# Patient Record
Sex: FEMALE | Race: WHITE
[De-identification: names, ages, dates, MRNs, and addresses within clinical notes are randomized per-mention and may not be internally consistent; named-entity substitution may affect disease eponyms.]

---

## 2018-08-01 ENCOUNTER — HOSPITAL ENCOUNTER (EMERGENCY)
Dept: HOSPITAL 62 - ER | Age: 23
Discharge: HOME | End: 2018-08-01
Payer: OTHER GOVERNMENT

## 2018-08-01 VITALS — SYSTOLIC BLOOD PRESSURE: 120 MMHG | DIASTOLIC BLOOD PRESSURE: 68 MMHG

## 2018-08-01 DIAGNOSIS — Z88.0: ICD-10-CM

## 2018-08-01 DIAGNOSIS — N39.0: Primary | ICD-10-CM

## 2018-08-01 DIAGNOSIS — R11.2: ICD-10-CM

## 2018-08-01 LAB
APPEARANCE UR: (no result)
APTT PPP: YELLOW S
BILIRUB UR QL STRIP: NEGATIVE
GLUCOSE UR STRIP-MCNC: NEGATIVE MG/DL
KETONES UR STRIP-MCNC: NEGATIVE MG/DL
NITRITE UR QL STRIP: NEGATIVE
PH UR STRIP: 9 [PH] (ref 5–9)
PROT UR STRIP-MCNC: 30 MG/DL
SP GR UR STRIP: 1.01
UROBILINOGEN UR-MCNC: NEGATIVE MG/DL (ref ?–2)

## 2018-08-01 PROCEDURE — 99284 EMERGENCY DEPT VISIT MOD MDM: CPT

## 2018-08-01 PROCEDURE — 81025 URINE PREGNANCY TEST: CPT

## 2018-08-01 PROCEDURE — S0119 ONDANSETRON 4 MG: HCPCS

## 2018-08-01 PROCEDURE — 81001 URINALYSIS AUTO W/SCOPE: CPT

## 2018-08-01 RX ADMIN — ONDANSETRON ONE MG: 4 TABLET, ORALLY DISINTEGRATING ORAL at 11:24

## 2018-08-01 NOTE — ER DOCUMENT REPORT
ED Medical Screen (RME)





- General


Chief Complaint: Abdominal Pain


Stated Complaint: ABDOMINAL CRAMPING


Time Seen by Provider: 08/01/18 11:11


Mode of Arrival: Ambulatory


Information source: Patient


TRAVEL OUTSIDE OF THE [place] IN LAST 30 DAYS: No





- HPI


Patient complains to provider of: Nausea and vomiting


Onset: Other - 3 days prior


Onset/Duration: Persistent


Quality of pain: No pain


Associated Symptoms: Nausea


Exacerbated by: Food


Similar symptoms previously: No


Recently seen / treated by doctor: No





- Related Data


Allergies/Adverse Reactions: 


 





Penicillins Allergy (Verified 08/01/18 10:13)


 











Past Medical History





- Social History


Cigarette use (# per day): No


Chew tobacco use (# tins/day): No


Frequency of alcohol use: Occasional


Drug Abuse: None





- Past Medical History


Cardiac Medical History: Reports: None


Pulmonary Medical History: Reports: None


EENT Medical History: Reports: None


Neurological Medical History: Reports: None


Endocrine Medical History: Reports: None





Review of Systems





- Review of Systems


Notes: 





All systems reviewed and otherwise negative





Physical Exam





- Vital signs


Vitals: 


 











Temp Pulse Resp BP Pulse Ox


 


 98.1 F   66   14   116/71   97 


 


 08/01/18 10:16  08/01/18 10:16  08/01/18 10:16  08/01/18 10:16  08/01/18 10:16














- General


General appearance: Appears well


In distress: None





- HEENT


Head: Normocephalic


Eyes: Normal


Conjunctiva: Normal





- Respiratory


Respiratory status: No respiratory distress


Chest status: Nontender


Breath sounds: Normal





- Abdominal


Inspection: Normal


Distension: No distension


Bowel sounds: Normal


Tenderness: Nontender





- Genitourinary


External exam: Normal





- Back


Back: Normal





- Extremities


General upper extremity: Normal inspection


General lower extremity: Normal inspection


Shoulder: Normal


Arm: Normal





- Neurological


Neuro grossly intact: Yes


Cognition: Normal


Orientation: AAOx4





Course





- Re-evaluation


Re-evalutation: 





08/01/18 14:02


22-year-old female presents for evaluation of nausea and vomiting for the last 

2 days.  She notes that she started her transdermal patch for birth control and 

increased nausea.  She denies any abdominal pain cramping pelvic pain discharge.


We will plan to treat with Zofran p.o. challenge and obtain a urine as well.


Patient is tolerated orals after obtaining Zofran, does have evidence of some 

pyuria as such we will presumptively treat for potential cystitis as an 

underlying cause of her nausea and vomiting.


We will treat with Zofran for symptom control and Keflex for her infection, 

will plan for return if in case of worsening symptoms





- Vital Signs


Vital signs: 


 











Temp Pulse Resp BP Pulse Ox


 


 98.1 F   66   18   120/68   99 


 


 08/01/18 10:16  08/01/18 10:16  08/01/18 12:48  08/01/18 12:48  08/01/18 12:48














- Laboratory


Laboratory results interpreted by me: 


 











  08/01/18





  11:17


 


Urine Protein  30 H


 


Ur Leukocyte Esterase  MODERATE H














Doctor's Discharge





- Discharge


Condition: Stable


Disposition: HOME, SELF-CARE


Instructions:  Abdominal Pain ([facility])


Additional Instructions: 


you were seen today in the emergency department for nausea and vomiting as well 

as the abdominal pain you had.  you had an evaluation including a physical exam 

as well as a urine test and medications.


You should use the nausea medication only as needed, use the antibiotic as 

directed, in case of inability to eat or drink he should return to the 

emergency room as this could be a more serious condition.


Prescriptions: 


Cephalexin Monohydrate [Keflex 500 mg Capsule] 500 mg PO BID 5 Days  capsule


Ondansetron [Zofran Odt 4 mg Tablet] 1 - 2 tab PO Q4H PRN #15 tab.rapdis


 PRN Reason: For Nausea/Vomiting

## 2018-11-03 ENCOUNTER — HOSPITAL ENCOUNTER (EMERGENCY)
Dept: HOSPITAL 62 - ER | Age: 23
Discharge: HOME | End: 2018-11-03
Payer: COMMERCIAL

## 2018-11-03 VITALS — SYSTOLIC BLOOD PRESSURE: 116 MMHG | DIASTOLIC BLOOD PRESSURE: 69 MMHG

## 2018-11-03 DIAGNOSIS — N93.9: Primary | ICD-10-CM

## 2018-11-03 DIAGNOSIS — F12.10: ICD-10-CM

## 2018-11-03 DIAGNOSIS — R11.0: ICD-10-CM

## 2018-11-03 DIAGNOSIS — F17.200: ICD-10-CM

## 2018-11-03 LAB
ADD MANUAL DIFF: NO
ALBUMIN SERPL-MCNC: 4.5 G/DL (ref 3.5–5)
ALP SERPL-CCNC: 57 U/L (ref 38–126)
ALT SERPL-CCNC: 22 U/L (ref 9–52)
ANION GAP SERPL CALC-SCNC: 10 MMOL/L (ref 5–19)
APPEARANCE UR: CLEAR
APTT PPP: YELLOW S
AST SERPL-CCNC: 23 U/L (ref 14–36)
BASOPHILS # BLD AUTO: 0 10^3/UL (ref 0–0.2)
BASOPHILS NFR BLD AUTO: 0.5 % (ref 0–2)
BILIRUB DIRECT SERPL-MCNC: 0.2 MG/DL (ref 0–0.4)
BILIRUB SERPL-MCNC: 0.9 MG/DL (ref 0.2–1.3)
BILIRUB UR QL STRIP: NEGATIVE
BUN SERPL-MCNC: 7 MG/DL (ref 7–20)
CALCIUM: 9.7 MG/DL (ref 8.4–10.2)
CHLAM PCR: NOT DETECTED
CHLORIDE SERPL-SCNC: 102 MMOL/L (ref 98–107)
CO2 SERPL-SCNC: 29 MMOL/L (ref 22–30)
EOSINOPHIL # BLD AUTO: 0.1 10^3/UL (ref 0–0.6)
EOSINOPHIL NFR BLD AUTO: 1.6 % (ref 0–6)
ERYTHROCYTE [DISTWIDTH] IN BLOOD BY AUTOMATED COUNT: 13.8 % (ref 11.5–14)
GLUCOSE SERPL-MCNC: 83 MG/DL (ref 75–110)
GLUCOSE UR STRIP-MCNC: NEGATIVE MG/DL
GON PCR: NOT DETECTED
HCT VFR BLD CALC: 43.9 % (ref 36–47)
HGB BLD-MCNC: 15.2 G/DL (ref 12–15.5)
KETONES UR STRIP-MCNC: NEGATIVE MG/DL
LYMPHOCYTES # BLD AUTO: 2.2 10^3/UL (ref 0.5–4.7)
LYMPHOCYTES NFR BLD AUTO: 40.3 % (ref 13–45)
MCH RBC QN AUTO: 31.8 PG (ref 27–33.4)
MCHC RBC AUTO-ENTMCNC: 34.6 G/DL (ref 32–36)
MCV RBC AUTO: 92 FL (ref 80–97)
MONOCYTES # BLD AUTO: 0.4 10^3/UL (ref 0.1–1.4)
MONOCYTES NFR BLD AUTO: 7.6 % (ref 3–13)
NEUTROPHILS # BLD AUTO: 2.7 10^3/UL (ref 1.7–8.2)
NEUTS SEG NFR BLD AUTO: 50 % (ref 42–78)
NITRITE UR QL STRIP: NEGATIVE
PH UR STRIP: 6 [PH] (ref 5–9)
PLATELET # BLD: 243 10^3/UL (ref 150–450)
POTASSIUM SERPL-SCNC: 4.4 MMOL/L (ref 3.6–5)
PROT SERPL-MCNC: 7.2 G/DL (ref 6.3–8.2)
PROT UR STRIP-MCNC: NEGATIVE MG/DL
RBC # BLD AUTO: 4.77 10^6/UL (ref 3.72–5.28)
RBCS (WET MOUNT): (no result)
SODIUM SERPL-SCNC: 141.1 MMOL/L (ref 137–145)
SP GR UR STRIP: 1.02
T.VAGINALIS (WET MOUNT): (no result)
TOTAL CELLS COUNTED % (AUTO): 100 %
UROBILINOGEN UR-MCNC: NEGATIVE MG/DL (ref ?–2)
WBC # BLD AUTO: 5.4 10^3/UL (ref 4–10.5)
WBCS (WET MOUNT): (no result)
YEAST (WET MOUNT): (no result)

## 2018-11-03 PROCEDURE — 99284 EMERGENCY DEPT VISIT MOD MDM: CPT

## 2018-11-03 PROCEDURE — 87591 N.GONORRHOEAE DNA AMP PROB: CPT

## 2018-11-03 PROCEDURE — 87210 SMEAR WET MOUNT SALINE/INK: CPT

## 2018-11-03 PROCEDURE — 81025 URINE PREGNANCY TEST: CPT

## 2018-11-03 PROCEDURE — 85025 COMPLETE CBC W/AUTO DIFF WBC: CPT

## 2018-11-03 PROCEDURE — 87491 CHLMYD TRACH DNA AMP PROBE: CPT

## 2018-11-03 PROCEDURE — 81001 URINALYSIS AUTO W/SCOPE: CPT

## 2018-11-03 PROCEDURE — 80053 COMPREHEN METABOLIC PANEL: CPT

## 2018-11-03 PROCEDURE — 36415 COLL VENOUS BLD VENIPUNCTURE: CPT

## 2018-11-03 NOTE — ER DOCUMENT REPORT
ED Medical Screen (RME)





- General


Chief Complaint: Vaginal Bleeding


Stated Complaint: VAGINAL BLEEDING


Time Seen by Provider: 11/03/18 15:27


Mode of Arrival: Ambulatory


Information source: Patient


TRAVEL OUTSIDE OF THE [place] IN LAST 30 DAYS: No





- HPI


Patient complains to provider of: vaginal bleeding


Onset: Yesterday - pt. started with heavy bleeding yesterday even though her 

most receny period ended





- Related Data


Allergies/Adverse Reactions: 


 





Penicillins Allergy (Verified 08/01/18 10:13)


 











Past Medical History


Renal/ Medical History: Denies: Hx Peritoneal Dialysis


Psychiatric Medical History: Reports: Hx Depression





Physical Exam





- Vital signs


Vitals: 





 











Temp Pulse Resp BP Pulse Ox


 


 98.3 F   52 L  16   126/77 H  97 


 


 11/03/18 14:54  11/03/18 14:54  11/03/18 14:54  11/03/18 14:54  11/03/18 14:54














Course





- Vital Signs


Vital signs: 





 











Temp Pulse Resp BP Pulse Ox


 


 98.3 F   52 L  16   126/77 H  97 


 


 11/03/18 14:54  11/03/18 14:54  11/03/18 14:54  11/03/18 14:54  11/03/18 14:54

## 2018-11-03 NOTE — ER DOCUMENT REPORT
ED GI/





- General


Chief Complaint: Vaginal Bleeding


Stated Complaint: VAGINAL BLEEDING


Time Seen by Provider: 11/03/18 15:27


Mode of Arrival: Ambulatory


Notes: 





Patient says she has been having very heavy vaginal since Monday.  She is 

passing some small clots as well as the bleeding.  Never had this before.  She 

is on birth control pills.  She had a normal menstrual cycle about a week ago 

and then started her birth control pills about 4-5 days before she started 

having the heavy bleeding on Monday.  When the bleeding started, she stopped 

taking her birth control pills.  She has been having some pelvic cramping.  

Nauseated without vomiting.  No fevers.


Patient has never been pregnant.





TRAVEL OUTSIDE OF THE [place] IN LAST 30 DAYS: No





- Related Data


Allergies/Adverse Reactions: 


 





Penicillins Allergy (Verified 08/01/18 10:13)


 











Past Medical History





- General


Information source: Patient





- Social History


Smoking Status: Current Every Day Smoker


Frequency of alcohol use: Social


Drug Abuse: Marijuana


Family History: Reviewed & Not Pertinent


Patient has suicidal ideation: No


Patient has homicidal ideation: No


Psychiatric Medical History: Reports: Hx Depression





Review of Systems





- Review of Systems


Notes: 





REVIEW OF SYSTEMS:





CONSTITUTIONAL :  Denies fever.


  


EENT:   Denies eye, ear, nose or mouth or throat pain or other symptoms.





CARDIOVASCULAR:  Denies chest pain.





RESPIRATORY:  Denies cough, chest congestion, or shortness of breath.





GASTROINTESTINAL:  Denies abdominal pain or vomiting, or diarrhea.  Nauseated





GENITOURINARY:  Denies difficulty or painful urinating, urinary frequency, 

blood in urine.





MUSCULOSKELETAL:  Denies back or neck pain.  Denies joint pain or swelling.





SKIN:   Denies rash or skin lesions.





NEUROLOGICAL:  Denies LOC or altered mental status.  Denies headache.  Denies 

sensory loss or motor deficits.





ALL OTHER SYSTEMS REVIEWED AND NEGATIVE.





Physical Exam





- Vital signs


Vitals: 


 











Temp Pulse Resp BP Pulse Ox


 


 98.3 F   52 L  16   126/77 H  97 


 


 11/03/18 14:54  11/03/18 14:54  11/03/18 14:54  11/03/18 14:54  11/03/18 14:54











Interpretation: Normal


Notes: 





PHYSICAL EXAMINATION:





GENERAL: Well-appearing, in no acute distress.





HEAD: Atraumatic, normocephalic.





EYES: Pupils equal round and reactive to light, extraocular movements intact.





ENT: oropharynx clear without exudates.  Moist mucous membranes.





NECK: Normal range of motion, supple.





LUNGS: Breath sounds clear and equal bilaterally.





HEART: Regular rate and rhythm without murmurs.





ABDOMEN: Soft, only minimally tender diffusely.  No guarding or rebound.  No 

masses.





BACK:  No tenderness throughout entire back.





EXTREMITIES: Normal range of motion without pain.





NEUROLOGICAL:  Normal speech, normal gait.  Normal sensory, motor, and reflex 

exams.  Awake, alert, and oriented x3.  Cranial nerves normal.





PSYCH: Normal mood, normal affect.





SKIN: Warm, dry, no rashes.





- Genitourinary


External exam: Normal


Speculum exam: Cervix closed, Other - Patient says she had a discharge a week 

or 2 ago, but not currently..  No: Cervix open, Vaginal discharge


Vaginal bleeding: Mild - Minimal amount of blood in vaginal vault now.


Bimanuel exam: Normal, Adnexal tenderness - Minimal and not localized..  No: 

Cervical motion tender, Adnexal mass, Uterus enlarged





Course





- Re-evaluation


Re-evalutation: 





11/03/18 20:38


All the laboratory studies including those of the pelvic exam were normal.





- Vital Signs


Vital signs: 


 











Temp Pulse Resp BP Pulse Ox


 


 97.5 F   52 L  28 H  116/69   98 


 


 11/03/18 17:01  11/03/18 17:01  11/03/18 17:01  11/03/18 17:01  11/03/18 17:01














- Laboratory


Result Diagrams: 


 11/03/18 15:32





 11/03/18 15:32





Discharge





- Discharge


Clinical Impression: 


 Vaginal bleeding between periods





Condition: Stable


Disposition: HOME, SELF-CARE


Additional Instructions: 


VAGINAL BLEEDING:





     You are having an episode of abnormal bleeding. Causes of abnormal vaginal 

bleeding can include miscarriage or tubal pregnancy, tumors such as cancer or 

benign fibroids, medication effects, or hormone imbalance. Testing can 

eliminate unsuspected pregnancy, tumors, or infection as a cause.


     "Dysfunctional uterine bleeding" is due to hormone imbalance, and is 

especially common at times when the normal cycle is disturbed -- whether by 

recent pregnancy, use of birth control pills or hormones, or impending 

menopause.  If the bleeding is innocent, most commonly a short course of 

hormones is given to restore the uterus to normal.


     Sometimes, the normal menstrual cycle corrects itself naturally.  Sometimes

, brief hormone therapy, or even a D&C is required.  Your physician will advise 

you.  Treatment for anemia may be required if bleeding is severe.


     You should rest and avoid intercourse until the bleeding is controlled.  

Call the doctor or return for re-examination if you feel faint, have increasing 

pain, or have a major increase in the amount of bleeding.











NORMAL EXAM AND WORKUP:


     At this time, except for vaginal bleeding, your examination and workup 

show no significant abnormality.  No significant abnormal physical findings 

were noted.  All laboratory, EKG, and imaging (x-ray, CT scans, ultrasound) 

studies that were ordered show no significant abnormality.


     Although your examination and all studies that were ordered showed no 

significant abnormal finding, there are no examinations and no studies that are 

100% accurate.  There is always the possibility that some abnormality could 

exist and not be detected with physical examination or within the limits and 

capabilities of laboratory and other studies.


     You should return or follow up as you were instructed on your visit today 

for further evaluation if your symptoms do not resolve.








Resume taking your birth control pills just like you were taking them before.  

Your bleeding should stop and when you finish taking the pills for this month, 

you should have another menstrual cycle with bleeding as you have been doing.








Ibuprofen





     Ibuprofen is an excellent, safe drug for pain control.  In addition, it 

has potent antiinflammatory effects which are beneficial, especially in the 

treatment of injuries, arthritis, or tendonitis. It's best to take ibuprofen 

with food.  Persons with ulcer disease or allergy to aspirin should notify 

their physician of this before taking ibuprofen.


     Take the medication exactly as prescribed.  Don't take additional doses 

unless instructed to do so by your doctor.  If you develop wheezing, shortness 

of breath, hives, faintness, stomach pain, vomiting, or dark black stools, 

return for re-evaluation at once.





Return for further evaluation if you have worsening symptoms or other symptoms 

such as fever, etc.








FOLLOW-UP CARE:


If you have been referred to a physician for follow-up care, call the physician

s office for an appointment as you were instructed or within the next two days.

  If you experience worsening or a significant change in your symptoms (very 

heavy bleeding with large clots of blood, passage of tissue, more severe 

abdominal / pelvic pain or cramping, feeling faint or severe weakness, fever, 

etc.), notify the physician immediately or return to the Emergency Department 

at any time for re-evaluation.








[facility] ([facility] IN [place]:





[facility]


    [address] [place] [address]


    [phone]